# Patient Record
Sex: MALE | Race: WHITE | NOT HISPANIC OR LATINO | Employment: FULL TIME | URBAN - METROPOLITAN AREA
[De-identification: names, ages, dates, MRNs, and addresses within clinical notes are randomized per-mention and may not be internally consistent; named-entity substitution may affect disease eponyms.]

---

## 2017-01-04 ENCOUNTER — OFFICE VISIT (OUTPATIENT)
Dept: RETAIL CLINIC | Facility: CLINIC | Age: 42
End: 2017-01-04

## 2017-01-04 VITALS
SYSTOLIC BLOOD PRESSURE: 122 MMHG | OXYGEN SATURATION: 98 % | HEART RATE: 96 BPM | RESPIRATION RATE: 18 BRPM | DIASTOLIC BLOOD PRESSURE: 85 MMHG | TEMPERATURE: 98.3 F

## 2017-01-04 DIAGNOSIS — H10.33 ACUTE CONJUNCTIVITIS OF BOTH EYES, UNSPECIFIED ACUTE CONJUNCTIVITIS TYPE: Primary | ICD-10-CM

## 2017-01-04 PROCEDURE — 99213 OFFICE O/P EST LOW 20 MIN: CPT | Performed by: NURSE PRACTITIONER

## 2017-01-04 RX ORDER — BROMPHENIRAMINE MALEATE, PSEUDOEPHEDRINE HYDROCHLORIDE, AND DEXTROMETHORPHAN HYDROBROMIDE 2; 30; 10 MG/5ML; MG/5ML; MG/5ML
SYRUP ORAL
Qty: 240 ML | Refills: 0 | Status: SHIPPED | OUTPATIENT
Start: 2017-01-04

## 2017-01-04 RX ORDER — POLYMYXIN B SULFATE AND TRIMETHOPRIM 1; 10000 MG/ML; [USP'U]/ML
SOLUTION OPHTHALMIC
Qty: 1 EACH | Refills: 0 | Status: SHIPPED | OUTPATIENT
Start: 2017-01-04

## 2017-01-04 NOTE — PROGRESS NOTES
Subjective:     Ayden Silva is a 41 y.o.     Conjunctivitis    The current episode started yesterday. The problem has been gradually worsening. Nothing relieves the symptoms. Associated symptoms include eye itching (at times), congestion, cough, URI, wheezing, eye discharge and eye redness. Pertinent negatives include no fever, no decreased vision, no diarrhea, no nausea, no vomiting, no ear pain, no headaches, no rhinorrhea, no sore throat, no rash and no eye pain.   URI    This is a new problem. The current episode started yesterday. There has been no fever. Associated symptoms include congestion, coughing, sneezing and wheezing. Pertinent negatives include no diarrhea, ear pain, headaches, nausea, plugged ear sensation, rash, rhinorrhea, sore throat or vomiting. He has tried nothing for the symptoms.         The following portions of the patient's history were reviewed and updated as appropriate: allergies, current medications, past family history, past medical history, past social history, past surgical history and problem list.      Review of Systems   Constitutional: Negative for appetite change and fever.   HENT: Positive for congestion, postnasal drip and sneezing. Negative for ear pain, rhinorrhea, sinus pressure and sore throat.    Eyes: Positive for discharge, redness and itching (at times). Negative for pain and visual disturbance.   Respiratory: Positive for cough and wheezing. Negative for shortness of breath.    Cardiovascular: Negative.    Gastrointestinal: Negative for diarrhea, nausea and vomiting.   Skin: Negative for color change, pallor and rash.   Allergic/Immunologic: Positive for environmental allergies.   Neurological: Positive for dizziness. Negative for light-headedness and headaches.         Objective:      Physical Exam   Constitutional: He is oriented to person, place, and time. He appears well-developed and well-nourished. He is cooperative.   HENT:   Head: Normocephalic and  atraumatic.   Left Ear: Tympanic membrane and ear canal normal.   Mouth/Throat: No oropharyngeal exudate or posterior oropharyngeal erythema.   Mild nasal congestion, left auditory canal with cerumen obscuring visualization. Cerumen removal discussed with patient. Verbalizes understanding. Patient wishes to try cerumen removal at home first.    Eyes: EOM are normal. Pupils are equal, round, and reactive to light. Right eye exhibits discharge. Left eye exhibits no discharge. Right conjunctiva is injected. Left conjunctiva is injected.   Right eye 20/50 uncorrected  Left eye 20/30 uncorrected  Bilateral 20/25 uncorrected   Cardiovascular: Normal rate, regular rhythm, S1 normal, S2 normal and normal heart sounds.    Pulmonary/Chest: Effort normal and breath sounds normal.   Abdominal: Soft. Normal appearance and bowel sounds are normal.   Lymphadenopathy:     He has no cervical adenopathy.   Neurological: He is alert and oriented to person, place, and time.   Skin: Skin is warm, dry and intact.   Psychiatric: He has a normal mood and affect. His speech is normal.   Vitals reviewed.          Ayden was seen today for conjunctivitis and uri.    Diagnoses and all orders for this visit:    Acute conjunctivitis of both eyes, unspecified acute conjunctivitis type    Other orders  -     trimethoprim-polymyxin b (POLYTRIM) 74295-8.1 UNIT/ML-% ophthalmic solution; 1 drop in affected eye every two hours while awake for conjunctivitis  -     brompheniramine-pseudoephedrine-DM (BROMFED DM) 30-2-10 MG/5ML syrup; 5 to 10 cc every 4 hours as needed for cough, congestion, allergies

## 2017-01-04 NOTE — PATIENT INSTRUCTIONS
Bacterial Conjunctivitis  Bacterial conjunctivitis (commonly called pink eye) is redness, soreness, or puffiness (inflammation) of the white part of your eye. It is caused by a germ called bacteria. These germs can easily spread from person to person (contagious). Your eye often will become red or pink. Your eye may also become irritated, watery, or have a thick discharge.   HOME CARE   · Apply a cool, clean washcloth over closed eyelids. Do this for 10-20 minutes, 3-4 times a day while you have pain.  · Gently wipe away any fluid coming from the eye with a warm, wet washcloth or cotton ball.  · Wash your hands often with soap and water. Use paper towels to dry your hands.  · Do not share towels or washcloths.  · Change or wash your pillowcase every day.  · Do not use eye makeup until the infection is gone.  · Do not use machines or drive if your vision is blurry.  · Stop using contact lenses. Do not use them again until your doctor says it is okay.  · Do not touch the tip of the eye drop bottle or medicine tube with your fingers when you put medicine on the eye.  GET HELP RIGHT AWAY IF:   · Your eye is not better after 3 days of starting your medicine.  · You have a yellowish fluid coming out of the eye.  · You have more pain in the eye.  · Your eye redness is spreading.  · Your vision becomes blurry.  · You have a fever or lasting symptoms for more than 2-3 days.  · You have a fever and your symptoms suddenly get worse.  · You have pain in the face.  · Your face gets red or puffy (swollen).  MAKE SURE YOU:   · Understand these instructions.  · Will watch this condition.  · Will get help right away if you are not doing well or get worse.     This information is not intended to replace advice given to you by your health care provider. Make sure you discuss any questions you have with your health care provider.     Document Released: 09/26/2009 Document Revised: 12/04/2013 Document Reviewed: 08/23/2013  Sesar  "Interactive Patient Education ©2016 Elsevier Inc.    Upper Respiratory Infection, Adult  Most upper respiratory infections (URIs) are a viral infection of the air passages leading to the lungs. A URI affects the nose, throat, and upper air passages. The most common type of URI is nasopharyngitis and is typically referred to as \"the common cold.\"  URIs run their course and usually go away on their own. Most of the time, a URI does not require medical attention, but sometimes a bacterial infection in the upper airways can follow a viral infection. This is called a secondary infection. Sinus and middle ear infections are common types of secondary upper respiratory infections.  Bacterial pneumonia can also complicate a URI. A URI can worsen asthma and chronic obstructive pulmonary disease (COPD). Sometimes, these complications can require emergency medical care and may be life threatening.   CAUSES  Almost all URIs are caused by viruses. A virus is a type of germ and can spread from one person to another.   RISKS FACTORS  You may be at risk for a URI if:   · You smoke.    · You have chronic heart or lung disease.  · You have a weakened defense (immune) system.    · You are very young or very old.    · You have nasal allergies or asthma.  · You work in crowded or poorly ventilated areas.  · You work in health care facilities or schools.  SIGNS AND SYMPTOMS   Symptoms typically develop 2-3 days after you come in contact with a cold virus. Most viral URIs last 7-10 days. However, viral URIs from the influenza virus (flu virus) can last 14-18 days and are typically more severe. Symptoms may include:   · Runny or stuffy (congested) nose.    · Sneezing.    · Cough.    · Sore throat.    · Headache.    · Fatigue.    · Fever.    · Loss of appetite.    · Pain in your forehead, behind your eyes, and over your cheekbones (sinus pain).  · Muscle aches.    DIAGNOSIS   Your health care provider may diagnose a URI by:  · Physical " exam.  · Tests to check that your symptoms are not due to another condition such as:  ¨ Strep throat.  ¨ Sinusitis.  ¨ Pneumonia.  ¨ Asthma.  TREATMENT   A URI goes away on its own with time. It cannot be cured with medicines, but medicines may be prescribed or recommended to relieve symptoms. Medicines may help:  · Reduce your fever.  · Reduce your cough.  · Relieve nasal congestion.  HOME CARE INSTRUCTIONS   · Take medicines only as directed by your health care provider.    · Gargle warm saltwater or take cough drops to comfort your throat as directed by your health care provider.  · Use a warm mist humidifier or inhale steam from a shower to increase air moisture. This may make it easier to breathe.  · Drink enough fluid to keep your urine clear or pale yellow.    · Eat soups and other clear broths and maintain good nutrition.    · Rest as needed.    · Return to work when your temperature has returned to normal or as your health care provider advises. You may need to stay home longer to avoid infecting others. You can also use a face mask and careful hand washing to prevent spread of the virus.  · Increase the usage of your inhaler if you have asthma.    · Do not use any tobacco products, including cigarettes, chewing tobacco, or electronic cigarettes. If you need help quitting, ask your health care provider.  PREVENTION   The best way to protect yourself from getting a cold is to practice good hygiene.   · Avoid oral or hand contact with people with cold symptoms.    · Wash your hands often if contact occurs.    There is no clear evidence that vitamin C, vitamin E, echinacea, or exercise reduces the chance of developing a cold. However, it is always recommended to get plenty of rest, exercise, and practice good nutrition.   SEEK MEDICAL CARE IF:   · You are getting worse rather than better.    · Your symptoms are not controlled by medicine.    · You have chills.  · You have worsening shortness of breath.  · You  have brown or red mucus.  · You have yellow or brown nasal discharge.  · You have pain in your face, especially when you bend forward.  · You have a fever.  · You have swollen neck glands.  · You have pain while swallowing.  · You have white areas in the back of your throat.  SEEK IMMEDIATE MEDICAL CARE IF:   · You have severe or persistent:    Headache.    Ear pain.    Sinus pain.    Chest pain.  · You have chronic lung disease and any of the following:    Wheezing.    Prolonged cough.    Coughing up blood.    A change in your usual mucus.  · You have a stiff neck.  · You have changes in your:    Vision.    Hearing.    Thinking.    Mood.  MAKE SURE YOU:   · Understand these instructions.  · Will watch your condition.  · Will get help right away if you are not doing well or get worse.     This information is not intended to replace advice given to you by your health care provider. Make sure you discuss any questions you have with your health care provider.     Document Released: 06/13/2002 Document Revised: 05/03/2016 Document Reviewed: 03/25/2015  [employer] Interactive Patient Education ©2016 [employer]

## 2017-01-04 NOTE — MR AVS SNAPSHOT
Ayden Silva   1/4/2017 3:30 PM   Office Visit    Dept Phone:  [phone]   Encounter #:  [id number]    Provider:  ELEONORA WALSH   Department:  [facility]                Your Full Care Plan              Today's Medication Changes          These changes are accurate as of: 1/4/17  4:57 PM.  If you have any questions, ask your nurse or doctor.               New Medication(s)Ordered:     brompheniramine-pseudoephedrine-DM 30-2-10 MG/5ML syrup   Commonly known as:  BROMFED DM   5 to 10 cc every 4 hours as needed for cough, congestion, allergies       trimethoprim-polymyxin b 35795-4.1 UNIT/ML-% ophthalmic solution   Commonly known as:  POLYTRIM   1 drop in affected eye every two hours while awake for conjunctivitis            Where to Get Your Medications      These medications were sent to [facility] [address] [place] [address] [address] [facility] [address] [facility] [phone] [facility] [phone]   [address] [facility], [facility] [address]     Phone:  [phone]     brompheniramine-pseudoephedrine-DM 30-2-10 MG/5ML syrup    trimethoprim-polymyxin b 03279-7.1 UNIT/ML-% ophthalmic solution                  Your Updated Medication List          This list is accurate as of: 1/4/17  4:57 PM.  Always use your most recent med list.                * albuterol 108 (90 BASE) MCG/ACT inhaler   Commonly known as:  PROVENTIL HFA;VENTOLIN HFA       * albuterol (2.5 MG/3ML) 0.083% nebulizer solution   Commonly known as:  PROVENTIL       brompheniramine-pseudoephedrine-DM 30-2-10 MG/5ML syrup   Commonly known as:  BROMFED DM   5 to 10 cc every 4 hours as needed for cough, congestion, allergies       montelukast 10 MG tablet   Commonly known as:  SINGULAIR       predniSONE 20 MG tablet   Commonly known as:  DELTASONE   Take 1 tablet by mouth 2 (two) times a day.       trimethoprim-polymyxin b 85551-4.1 UNIT/ML-% ophthalmic solution   Commonly known as:  POLYTRIM   1 drop  in affected eye every two hours while awake for conjunctivitis       * Notice:  This list has 2 medication(s) that are the same as other medications prescribed for you. Read the directions carefully, and ask your doctor or other care provider to review them with you.            You Were Diagnosed With        Codes Comments    Acute conjunctivitis of both eyes, unspecified acute conjunctivitis type    -  Primary ICD-10-CM: H10.33  ICD-9-CM: 372.00       Instructions    Bacterial Conjunctivitis  Bacterial conjunctivitis (commonly called pink eye) is redness, soreness, or puffiness (inflammation) of the white part of your eye. It is caused by a germ called bacteria. These germs can easily spread from person to person (contagious). Your eye often will become red or pink. Your eye may also become irritated, watery, or have a thick discharge.   HOME CARE   · Apply a cool, clean washcloth over closed eyelids. Do this for 10-20 minutes, 3-4 times a day while you have pain.  · Gently wipe away any fluid coming from the eye with a warm, wet washcloth or cotton ball.  · Wash your hands often with soap and water. Use paper towels to dry your hands.  · Do not share towels or washcloths.  · Change or wash your pillowcase every day.  · Do not use eye makeup until the infection is gone.  · Do not use machines or drive if your vision is blurry.  · Stop using contact lenses. Do not use them again until your doctor says it is okay.  · Do not touch the tip of the eye drop bottle or medicine tube with your fingers when you put medicine on the eye.  GET HELP RIGHT AWAY IF:   · Your eye is not better after 3 days of starting your medicine.  · You have a yellowish fluid coming out of the eye.  · You have more pain in the eye.  · Your eye redness is spreading.  · Your vision becomes blurry.  · You have a fever or lasting symptoms for more than 2-3 days.  · You have a fever and your symptoms suddenly get worse.  · You have pain in the  "face.  · Your face gets red or puffy (swollen).  MAKE SURE YOU:   · Understand these instructions.  · Will watch this condition.  · Will get help right away if you are not doing well or get worse.     This information is not intended to replace advice given to you by your health care provider. Make sure you discuss any questions you have with your health care provider.     Document Released: 09/26/2009 Document Revised: 12/04/2013 Document Reviewed: 08/23/2013  [employer] Interactive Patient Education ©2016 Elsevier Inc.    Upper Respiratory Infection, Adult  Most upper respiratory infections (URIs) are a viral infection of the air passages leading to the lungs. A URI affects the nose, throat, and upper air passages. The most common type of URI is nasopharyngitis and is typically referred to as \"the common cold.\"  URIs run their course and usually go away on their own. Most of the time, a URI does not require medical attention, but sometimes a bacterial infection in the upper airways can follow a viral infection. This is called a secondary infection. Sinus and middle ear infections are common types of secondary upper respiratory infections.  Bacterial pneumonia can also complicate a URI. A URI can worsen asthma and chronic obstructive pulmonary disease (COPD). Sometimes, these complications can require emergency medical care and may be life threatening.   CAUSES  Almost all URIs are caused by viruses. A virus is a type of germ and can spread from one person to another.   RISKS FACTORS  You may be at risk for a URI if:   · You smoke.    · You have chronic heart or lung disease.  · You have a weakened defense (immune) system.    · You are very young or very old.    · You have nasal allergies or asthma.  · You work in crowded or poorly ventilated areas.  · You work in health care facilities or schools.  SIGNS AND SYMPTOMS   Symptoms typically develop 2-3 days after you come in contact with a cold virus. Most viral URIs " last 7-10 days. However, viral URIs from the influenza virus (flu virus) can last 14-18 days and are typically more severe. Symptoms may include:   · Runny or stuffy (congested) nose.    · Sneezing.    · Cough.    · Sore throat.    · Headache.    · Fatigue.    · Fever.    · Loss of appetite.    · Pain in your forehead, behind your eyes, and over your cheekbones (sinus pain).  · Muscle aches.    DIAGNOSIS   Your health care provider may diagnose a URI by:  · Physical exam.  · Tests to check that your symptoms are not due to another condition such as:  ¨ Strep throat.  ¨ Sinusitis.  ¨ Pneumonia.  ¨ Asthma.  TREATMENT   A URI goes away on its own with time. It cannot be cured with medicines, but medicines may be prescribed or recommended to relieve symptoms. Medicines may help:  · Reduce your fever.  · Reduce your cough.  · Relieve nasal congestion.  HOME CARE INSTRUCTIONS   · Take medicines only as directed by your health care provider.    · Gargle warm saltwater or take cough drops to comfort your throat as directed by your health care provider.  · Use a warm mist humidifier or inhale steam from a shower to increase air moisture. This may make it easier to breathe.  · Drink enough fluid to keep your urine clear or pale yellow.    · Eat soups and other clear broths and maintain good nutrition.    · Rest as needed.    · Return to work when your temperature has returned to normal or as your health care provider advises. You may need to stay home longer to avoid infecting others. You can also use a face mask and careful hand washing to prevent spread of the virus.  · Increase the usage of your inhaler if you have asthma.    · Do not use any tobacco products, including cigarettes, chewing tobacco, or electronic cigarettes. If you need help quitting, ask your health care provider.  PREVENTION   The best way to protect yourself from getting a cold is to practice good hygiene.   · Avoid oral or hand contact with people with  cold symptoms.    · Wash your hands often if contact occurs.    There is no clear evidence that vitamin C, vitamin E, echinacea, or exercise reduces the chance of developing a cold. However, it is always recommended to get plenty of rest, exercise, and practice good nutrition.   SEEK MEDICAL CARE IF:   · You are getting worse rather than better.    · Your symptoms are not controlled by medicine.    · You have chills.  · You have worsening shortness of breath.  · You have brown or red mucus.  · You have yellow or brown nasal discharge.  · You have pain in your face, especially when you bend forward.  · You have a fever.  · You have swollen neck glands.  · You have pain while swallowing.  · You have white areas in the back of your throat.  SEEK IMMEDIATE MEDICAL CARE IF:   · You have severe or persistent:    Headache.    Ear pain.    Sinus pain.    Chest pain.  · You have chronic lung disease and any of the following:    Wheezing.    Prolonged cough.    Coughing up blood.    A change in your usual mucus.  · You have a stiff neck.  · You have changes in your:    Vision.    Hearing.    Thinking.    Mood.  MAKE SURE YOU:   · Understand these instructions.  · Will watch your condition.  · Will get help right away if you are not doing well or get worse.     This information is not intended to replace advice given to you by your health care provider. Make sure you discuss any questions you have with your health care provider.     Document Released: 06/13/2002 Document Revised: 05/03/2016 Document Reviewed: 03/25/2015  [employer] Interactive Patient Education ©2016 [employer].       Patient Instructions History      Upcoming Appointments     Visit Type Date Time Department    OFFICE VISIT 1/4/2017  3:30 PM [facility] BRIANFELICIANO Landry Signup     [facility] [employer] allows you to send messages to your doctor, view your test results, renew your prescriptions, schedule appointments, and more. To sign up, go to  [email or web address] and click on the Sign Up Now link in the New User? box. Enter your [employer] Activation Code exactly as it appears below along with the last four digits of your Social Security Number and your Date of Birth () to complete the sign-up process. If you do not sign up before the expiration date, you must request a new code.    [employer] Activation Code: [email or web address]  Expires: 2017  5:34 AM    If you have questions, you can email [email or web address] or call [phone] to talk to our [employer] staff. Remember, [employer] is NOT to be used for urgent needs. For medical emergencies, dial 911.               Other Info from Your Visit           Allergies     Aspirin      Codeine      Latex      Levaquin [Levofloxacin]      Other      All mycins    Penicillins        Reason for Visit     Conjunctivitis     URI           Vital Signs     Blood Pressure Pulse Temperature Respirations Oxygen Saturation Smoking Status    122/85 96 98.3 °F (36.8 °C) 18 98% Never Smoker      Problems and Diagnoses Noted     Conjunctivitis    -  Primary

## 2017-08-28 NOTE — LETTER
January 4, 2017     Patient: Ayden Silva   YOB: 1975   Date of Visit: 1/4/2017       To Whom It May Concern:    It is my medical opinion that Ayden Silva may return to work on 01/06/2017.           Sincerely,        PROVIDER ALYSSIA WALSH    CC: No Recipients   [facility]    If you have any questions regarding to your visit please contact your care team:     Team Pink:   Clinic Hours Telephone Number   Internal Medicine:  Dr. Suze Zapata NP       7am-7pm  Monday - Thursday   7am-5pm  Fridays  [phone]  (Appointment scheduling available 24/7)    Questions about your visit?  Team Line  [phone]   [facility] - [place] [facility] - 11am-9pm Monday-Friday Saturday-Sunday- 9am-5pm   [place] - 5pm-9pm Monday-Friday Saturday-Sunday- 9am-5pm  [phone] - [place]   [phone] - [place]       What options do I have for visits at the clinic other than the traditional office visit?  To expand how we care for you, many of our providers are utilizing electronic visits (e-visits) and telephone visits, when medically appropriate, for interactions with their patients rather than a visit in the clinic.   We also offer nurse visits for many medical concerns. Just like any other service, we will bill your insurance company for this type of visit based on time spent on the phone with your provider. Not all insurance companies cover these visits. Please check with your medical insurance if this type of visit is covered. You will be responsible for any charges that are not paid by your insurance.      E-visits via [employer]:  generally incur a $35.00 fee.  Telephone visits:  Time spent on the phone: *charged based on time that is spent on the phone in increments of 10 minutes. Estimated cost:   5-10 mins $30.00   11-20 mins. $59.00   21-30 mins. $85.00   Use [employer] (secure email communication and access to your chart) to send your primary care provider a message or make an appointment. Ask someone on your Team how to sign up for [employer].    For a Price Quote for your services, please call our Consumer Price Line at [phone].    As always, Thank you for trusting us with your health care  needs!    Amalia Murillo, CMA